# Patient Record
Sex: MALE | Race: WHITE | NOT HISPANIC OR LATINO | Employment: FULL TIME | URBAN - METROPOLITAN AREA
[De-identification: names, ages, dates, MRNs, and addresses within clinical notes are randomized per-mention and may not be internally consistent; named-entity substitution may affect disease eponyms.]

---

## 2022-12-01 ENCOUNTER — LAB REQUISITION (OUTPATIENT)
Dept: LAB | Facility: CLINIC | Age: 47
End: 2022-12-01
Payer: COMMERCIAL

## 2022-12-01 DIAGNOSIS — B96.89 OTHER SPECIFIED BACTERIAL AGENTS AS THE CAUSE OF DISEASES CLASSIFIED ELSEWHERE: ICD-10-CM

## 2022-12-01 DIAGNOSIS — M75.101 UNSPECIFIED ROTATOR CUFF TEAR OR RUPTURE OF RIGHT SHOULDER, NOT SPECIFIED AS TRAUMATIC: ICD-10-CM

## 2022-12-01 LAB
GRAM STAIN RESULT: NORMAL

## 2022-12-01 PROCEDURE — 87176 TISSUE HOMOGENIZATION CULTR: CPT | Mod: ORL | Performed by: ORTHOPAEDIC SURGERY

## 2022-12-01 PROCEDURE — 87075 CULTR BACTERIA EXCEPT BLOOD: CPT | Mod: ORL | Performed by: ORTHOPAEDIC SURGERY

## 2022-12-01 PROCEDURE — 87205 SMEAR GRAM STAIN: CPT | Mod: ORL | Performed by: ORTHOPAEDIC SURGERY

## 2022-12-07 LAB
BACTERIA TISS BX CULT: ABNORMAL

## 2022-12-14 LAB — BACTERIA TISS BX CULT: ABNORMAL

## 2022-12-15 LAB
BACTERIA TISS BX CULT: ABNORMAL
BACTERIA TISS BX CULT: NORMAL

## 2023-02-06 ENCOUNTER — TELEPHONE (OUTPATIENT)
Dept: UROLOGY | Facility: CLINIC | Age: 48
End: 2023-02-06
Payer: COMMERCIAL

## 2023-02-06 NOTE — TELEPHONE ENCOUNTER
" [facility] Call [facility]    Phone Message    May a detailed message be left on voicemail: yes     Reason for Call: Appointment Intake    Referring Provider Name: Rosa Bocanegra  Diagnosis and/or Symptoms: Inflamed Prostate, low testosterone.    Pt states he needs to speak to Kelsie that works with Dr. Cintron as pt was referred by an employee at the [facility]  (Rosa Bocanegra) and Kelsie knows \"whats going on\"    Action Taken: Message routed to:  [facility] Uro    Travel Screening: Not Applicable                                                                      "

## 2023-02-07 NOTE — TELEPHONE ENCOUNTER
Pt phoned, provided flomax and testosterone from IM provider, seeking to establish care in urology    Feels he does not empty his bladder well and has enlarged prostate.  His testosterone has improved, informed Dr. Cintron would likely refer to Dr. Stroud to follow this     appt made for next in person for uroflow pvr

## 2023-02-09 NOTE — TELEPHONE ENCOUNTER
MEDICAL RECORDS REQUEST   [facility]  Urology Clinic  [address] [place] [address]  PHONE: [phone]  Fax: [phone]        FUTURE VISIT INFORMATION                                                   Korey BELCHER KATHLEEN Arriola: 1975 scheduled for future visit at [facility]    APPOINTMENT INFORMATION:    Date: 2023    Provider:  Doe Cintron MD    Reason for Visit/Diagnosis: bph consult-Uroflow/pvr/aua on flomax from IM provider    RECORDS REQUESTED FOR VISIT                                                     NOTES  STATUS/DETAILS   OFFICE NOTE from other specialist  yes, 2023 -- Jasen Hargrove MD  @    MEDICATION LIST  yes   LABS     PSA  yes     PRE-VISIT CHECKLIST      Record collection complete Yes   Appointment appropriately scheduled           (right time/right provider) Yes   Joint diagnostic appointment coordinated correctly          (ensure right order & amount of time) Yes   MyChart activation If no, please explain PENDING   Questionnaire complete If no, please explain PENDING

## 2023-03-07 ENCOUNTER — PRE VISIT (OUTPATIENT)
Dept: UROLOGY | Facility: CLINIC | Age: 48
End: 2023-03-07

## 2023-03-07 ENCOUNTER — OFFICE VISIT (OUTPATIENT)
Dept: UROLOGY | Facility: CLINIC | Age: 48
End: 2023-03-07
Payer: COMMERCIAL

## 2023-03-07 VITALS
BODY MASS INDEX: 24.38 KG/M2 | SYSTOLIC BLOOD PRESSURE: 108 MMHG | HEART RATE: 85 BPM | WEIGHT: 190 LBS | DIASTOLIC BLOOD PRESSURE: 71 MMHG | HEIGHT: 74 IN | OXYGEN SATURATION: 97 %

## 2023-03-07 DIAGNOSIS — M62.89 PELVIC FLOOR DYSFUNCTION: Primary | ICD-10-CM

## 2023-03-07 PROCEDURE — 51741 ELECTRO-UROFLOWMETRY FIRST: CPT | Performed by: UROLOGY

## 2023-03-07 PROCEDURE — 51798 US URINE CAPACITY MEASURE: CPT | Performed by: UROLOGY

## 2023-03-07 PROCEDURE — 99203 OFFICE O/P NEW LOW 30 MIN: CPT | Mod: 25 | Performed by: UROLOGY

## 2023-03-07 PROCEDURE — 52000 CYSTOURETHROSCOPY: CPT | Performed by: UROLOGY

## 2023-03-07 RX ORDER — CEPHALEXIN 500 MG/1
500 CAPSULE ORAL ONCE
Status: COMPLETED | OUTPATIENT
Start: 2023-03-07 | End: 2023-03-07

## 2023-03-07 RX ORDER — TESTOSTERONE 20.25 MG/1.25G
GEL TOPICAL
COMMUNITY
Start: 2023-02-02

## 2023-03-07 RX ORDER — TAMSULOSIN HYDROCHLORIDE 0.4 MG/1
CAPSULE ORAL
COMMUNITY
Start: 2023-01-27 | End: 2024-06-21

## 2023-03-07 RX ORDER — VALACYCLOVIR HYDROCHLORIDE 1 G/1
TABLET, FILM COATED ORAL
COMMUNITY
Start: 2023-02-08

## 2023-03-07 RX ORDER — LIDOCAINE HYDROCHLORIDE 20 MG/ML
JELLY TOPICAL ONCE
Status: COMPLETED | OUTPATIENT
Start: 2023-03-07 | End: 2023-03-07

## 2023-03-07 RX ORDER — METHYLPHENIDATE HYDROCHLORIDE 10 MG/1
TABLET ORAL
COMMUNITY
Start: 2023-01-27

## 2023-03-07 RX ORDER — HYDROCORTISONE 2.5 %
CREAM (GRAM) TOPICAL
COMMUNITY
Start: 2022-11-16

## 2023-03-07 RX ADMIN — LIDOCAINE HYDROCHLORIDE: 20 JELLY TOPICAL at 16:15

## 2023-03-07 RX ADMIN — CEPHALEXIN 500 MG: 500 CAPSULE ORAL at 16:22

## 2023-03-07 ASSESSMENT — PAIN SCALES - GENERAL: PAINLEVEL: NO PAIN (0)

## 2023-03-07 NOTE — PROGRESS NOTES
UROLOGY OUTPATIENT VISIT      Chief Complaint:   LUTS      Synopsis    Kroey Arriola is a very pleasant AGE: 47 year old year old person  He is self-referred for a history of progressive difficulty emptying his bladder  He notes that he has had trouble with this including slow stream and feeling of incomplete emptying for many years  He recently sought evaluation by his primary medical provider who put him on Flomax  He reports that Flomax is inconsistent, doesn't like lightheadedness  A secondary concern of his hypogonadism.  He notes that he has felt generally fatigued and not quite like himself for the past several months.  His PMD ordered a testosterone level which came back in the 200s.  He was started on testosterone gel.  He is not having his testosterone levels monitored at the moment but subjectively reports that it is made a considerable improvement in his overall wellbeing.  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  We injected lidocaine jelly.  We gently passed the 15 [place]ijian flexible cystoscope through the urethral meatus.  The anterior urethra was normal once we reached the membranous urethra he had a very tight pelvic floor and we had considerable difficulty due to discomfort advancing the scope through the prostate and into the bladder.  We ultimately were able to pass the scope all the way into the bladder which appeared to be mildly trabeculated.  He was tolerating the scope  extremely poorly at this time.  Pullback ureteroscopy was also quite difficult due to the extremely tight pelvic floor.  The prostate however while elevated did not have very prominent hypertrophy.  We did not see any bladder pathology aside from mild trabeculations although the view was quite limited in brief.    A digital rectal exam was performed notable for a medium sized prostate without nodularity             Medications     Current Outpatient Medications   Medication     methylphenidate (RITALIN) 20 MG tablet     omeprazole (PRILOSEC) 20 MG DR capsule     Testosterone 1.62 % GEL     valACYclovir (VALTREX) 1000 mg tablet     hydrocortisone 2.5 % cream     tamsulosin (FLOMAX) 0.4 MG capsule     No current facility-administered medications for this visit.         The following  distinct labs were reviewed    I personally reviewed all applicable laboratory data and went over findings with patient  Significant for:    January 2023 -testosterone level 251 (this was repeated 4 days later and was 381)  January 2023 -PSA 1.4  December 2022 -hemoglobin 15.5, platelets 234  August 2022 -creatinine 0.83                   Assessment/Plan   47 year old year old person with lower urinary tract symptoms, incomplete bladder emptying, hypogonadism  -I suspect based on his work-up today that the primary etiology of his lower urinary tract symptoms is pelvic floor dysfunction.  I recommend that he meet with a pelvic floor physical therapist to learn biofeedback and pelvic floor relaxation.  We will follow-up with him in the next 2 to 3 months to ensure symptom improvement.  -In terms of his hypogonadism, I recommend consultation with an endocrinologist with a plan to regularly monitor testosterone levels.  A consult referral was placed    CC:  No primary care provider on file.      35v minutes spent on the date of the encounter, including direct interaction with the patient, performing chart review, documentation and further  activities as noted above, exclusive of the time spent performing  cystoscopy    IDoe saw and evaluated this patient and agree with the plan as stated above.  I personally performed all listed procedures.

## 2023-03-07 NOTE — NURSING NOTE
"Chief Complaint   Patient presents with     Consult     BPH       Blood pressure 108/71, pulse 85, height 1.88 m (6' 2\"), weight 86.2 kg (190 lb), SpO2 97 %. Body mass index is 24.39 kg/m .    There is no problem list on file for this patient.      Allergies   Allergen Reactions     No Clinical Screening - See Comments      PN: Other1: -cats, PN: Pollen, dust, mold and animal dander  PN: Other1: -cats, PN: Pollen, dust, mold and animal dander         Current Outpatient Medications   Medication Sig Dispense Refill     methylphenidate (RITALIN) 20 MG tablet        omeprazole (PRILOSEC) 20 MG DR capsule        Testosterone 1.62 % GEL APPLY 1 PUMP TOPICALLY TO THE AFFECTED AREA DAILY       valACYclovir (VALTREX) 1000 mg tablet        hydrocortisone 2.5 % cream APPLY THIN LAYER TOPICALLY TO RASH IN AXILLAE 2 TO 3 TIMES DAILY UNTIL IMPROVED (Patient not taking: Reported on 3/7/2023)       tamsulosin (FLOMAX) 0.4 MG capsule  (Patient not taking: Reported on 3/7/2023)         Social History     Tobacco Use     Smoking status: Never     Smokeless tobacco: Former     Quit date: 3/7/2013   Substance Use Topics     Alcohol use: Yes     Comment: Alcoholic Drinks/day: 15-18 per week     Drug use: Unknown     Types: Other     Comment: Drug use: No       Invasive Procedure Safety Checklist:    Procedure: Cystoscopy    Action: Complete sections and checkboxes as appropriate.    Pre-procedure:  1. Patient ID Verified with 2 identifiers (Deena and  or MRN) : YES    2. Procedure and site verified with patient/designee (when able) : YES    3. Accurate consent documentation in medical record : YES    4. H&P (or appropriate assessment) documented in medical record : N/A  H&P must be up to 30 days prior to procedure an updated within 24 hours of                 Procedure as applicable.     5. Relevant diagnostic and radiology test results appropriately labeled and displayed as applicable : YES    6. Blood products, implants, devices, " and/or special equipment available for the procedure as applicable : YES    7. Procedure site(s) marked with provider initials [Exclusions: none] : NO    8. Marking not required. Reason : Yes  Procedure does not require site marking    Time Out:     Time-Out performed immediately prior to starting procedure, including verbal and active participation of all team members addressing: YES    1. Correct patient identity.  2. Confirmed that the correct side and site are marked.  3. An accurate procedure to be done.  4. Agreement on the procedure to be done.  5. Correct patient position.  6. Relevant images and results are properly labeled and appropriately displayed.  7. The need to administer antibiotics or fluids for irrigation purposes during the procedure as applicable.  8. Safety precautions based on patient history or medication use.    During Procedure: Verification of correct person, site, and procedure occurs any time the responsibility for care of the patient is transferred to another member of the care team.    The following medication was given:     MEDICATION:  Lidocaine without epinephrine 2% jelly  ROUTE: urethral   SITE: urethral   DOSE: 10 mL  LOT #: [id number]  : [employer]  EXPIRATION DATE: 9-24  NDC#: [address]-9   Was there drug waste? No    Prior to med admin, verified patient identity using patient's name and date of birth.  Due to med administration, patient instructed to remain in clinic for 15 minutes  afterwards, and to report any adverse reaction to me immediately.    Drug Amount Wasted:  None.  Vial/Syringe: ADALBERTO Gibson  3/7/2023  4:44 PM

## 2023-03-07 NOTE — NURSING NOTE
"Chief Complaint   Patient presents with     Consult     BPH       Blood pressure 108/71, pulse 85, height 1.88 m (6' 2\"), weight 86.2 kg (190 lb), SpO2 97 %. Body mass index is 24.39 kg/m .    There is no problem list on file for this patient.      Allergies   Allergen Reactions     No Clinical Screening - See Comments      PN: Other1: -cats, PN: Pollen, dust, mold and animal dander  PN: Other1: -cats, PN: Pollen, dust, mold and animal dander         Current Outpatient Medications   Medication Sig Dispense Refill     methylphenidate (RITALIN) 20 MG tablet        omeprazole (PRILOSEC) 20 MG DR capsule        Testosterone 1.62 % GEL APPLY 1 PUMP TOPICALLY TO THE AFFECTED AREA DAILY       valACYclovir (VALTREX) 1000 mg tablet        hydrocortisone 2.5 % cream APPLY THIN LAYER TOPICALLY TO RASH IN AXILLAE 2 TO 3 TIMES DAILY UNTIL IMPROVED (Patient not taking: Reported on 3/7/2023)       tamsulosin (FLOMAX) 0.4 MG capsule  (Patient not taking: Reported on 3/7/2023)         Social History     Tobacco Use     Smoking status: Never     Smokeless tobacco: Former     Quit date: 3/7/2013   Substance Use Topics     Alcohol use: Yes     Comment: Alcoholic Drinks/day: 15-18 per week     Drug use: Unknown     Types: Other     Comment: Drug use: No       ADALBERTO Bess  3/7/2023  3:40 PM  "

## 2023-03-07 NOTE — PATIENT INSTRUCTIONS
Please schedule a 3-month follow up appointment with Dr. Cintron.    It was a pleasure meeting with you today.  Thank you for allowing me and my team the privilege of caring for you today.  YOU are the reason we are here, and I truly hope we provided you with the excellent service you deserve.  Please let us know if there is anything else we can do for you so that we can be sure you are leaving completely satisfied with your care experience.

## 2023-03-07 NOTE — LETTER
3/7/2023       RE: Korey Arriola  [address]  [facility] [address]     Dear Colleague,    Thank you for referring your patient, Korey Arriola, to the [facility]. Please see a copy of my visit note below.          UROLOGY OUTPATIENT VISIT      Chief Complaint:   LUTS      Synopsis    Korey Arriola is a very pleasant AGE: 47 year old year old person  He is self-referred for a history of progressive difficulty emptying his bladder  He notes that he has had trouble with this including slow stream and feeling of incomplete emptying for many years  He recently sought evaluation by his primary medical provider who put him on Flomax  He reports that Flomax is inconsistent, doesn't like lightheadedness  A secondary concern of his hypogonadism.  He notes that he has felt generally fatigued and not quite like himself for the past several months.  His PMD ordered a testosterone level which came back in the 200s.  He was started on testosterone gel.  He is not having his testosterone levels monitored at the moment but subjectively reports that it is made a considerable improvement in his overall wellbeing.  He denies family history of prostate problems  He denies history of urinary tract infection, prostatitis, hematuria   He has a job in IT sales    [facility] uro prostate review 3/7/2023   Peak Flow 7.8   Average Flow 3.9   Residual Volume by Ultrasound 322   Character of Curve Can not be characterized   AUA Score 27       Given the elevated postvoid residual and his acute symptoms shared decision was made to add on for a cystoscopy today  After obtaining informed consent we proceeded to the cystoscopy room  I started the procedure by prepping and draping the patient.  We injected lidocaine jelly.  We gently passed the 15 [place] flexible cystoscope through the urethral meatus.  The anterior urethra was normal once we reached the  membranous urethra he had a very tight pelvic floor and we had considerable difficulty due to discomfort advancing the scope through the prostate and into the bladder.  We ultimately were able to pass the scope all the way into the bladder which appeared to be mildly trabeculated.  He was tolerating the scope extremely poorly at this time.  Pullback ureteroscopy was also quite difficult due to the extremely tight pelvic floor.  The prostate however while elevated did not have very prominent hypertrophy.  We did not see any bladder pathology aside from mild trabeculations although the view was quite limited in brief.    A digital rectal exam was performed notable for a medium sized prostate without nodularity             Medications     Current Outpatient Medications   Medication     methylphenidate (RITALIN) 20 MG tablet     omeprazole (PRILOSEC) 20 MG DR capsule     Testosterone 1.62 % GEL     valACYclovir (VALTREX) 1000 mg tablet     hydrocortisone 2.5 % cream     tamsulosin (FLOMAX) 0.4 MG capsule     No current facility-administered medications for this visit.         The following  distinct labs were reviewed    I personally reviewed all applicable laboratory data and went over findings with patient  Significant for:    January 2023 -testosterone level 251 (this was repeated 4 days later and was 381)  January 2023 -PSA 1.4  December 2022 -hemoglobin 15.5, platelets 234  August 2022 -creatinine 0.83                   Assessment/Plan   47 year old year old person with lower urinary tract symptoms, incomplete bladder emptying, hypogonadism  -I suspect based on his work-up today that the primary etiology of his lower urinary tract symptoms is pelvic floor dysfunction.  I recommend that he meet with a pelvic floor physical therapist to learn biofeedback and pelvic floor relaxation.  We will follow-up with him in the next 2 to 3 months to ensure symptom improvement.  -In terms of his hypogonadism, I recommend  consultation with an endocrinologist with a plan to regularly monitor testosterone levels.  A consult referral was placed    CC:  No primary care provider on file.            Again, thank you for allowing me to participate in the care of your patient.      Sincerely,    Doe Cintron MD       VTE Assessment already completed for this visit

## 2023-03-07 NOTE — NURSING NOTE
The following medication was given:     MEDICATION:  CEPHALEXIN (Keflex)  ROUTE: PO  SITE: Medication was given orally   DOSE: 500 mg.  LOT #: [id number]  : [employer]  EXPIRATION DATE: 09/24  NDC#: (00) [id number]   Was there drug waste? No    Prior to administration, verified patient identity using patient's name and date of birth.    Drug Amount Wasted:  None.  Vial/Syringe: Single dose    Ananda Seaman, EMT   3/7/2023  4:46 PM

## 2023-03-07 NOTE — LETTER
Date:March 8, 2023      Provider requested that no letter be sent. Do not send.       [facility]

## 2023-03-08 ENCOUNTER — TELEPHONE (OUTPATIENT)
Dept: UROLOGY | Facility: CLINIC | Age: 48
End: 2023-03-08
Payer: COMMERCIAL

## 2023-03-09 ENCOUNTER — TELEPHONE (OUTPATIENT)
Dept: UROLOGY | Facility: CLINIC | Age: 48
End: 2023-03-09
Payer: COMMERCIAL

## 2023-04-20 ENCOUNTER — THERAPY VISIT (OUTPATIENT)
Dept: PHYSICAL THERAPY | Facility: CLINIC | Age: 48
End: 2023-04-20
Payer: COMMERCIAL

## 2023-04-20 DIAGNOSIS — M62.89 PELVIC FLOOR DYSFUNCTION: ICD-10-CM

## 2023-04-20 PROCEDURE — 97110 THERAPEUTIC EXERCISES: CPT | Mod: GP

## 2023-04-20 PROCEDURE — 97535 SELF CARE MNGMENT TRAINING: CPT | Mod: GP

## 2023-04-20 PROCEDURE — 97161 PT EVAL LOW COMPLEX 20 MIN: CPT | Mod: GP

## 2023-04-20 NOTE — PROGRESS NOTES
Physical Therapy Initial Evaluation  Subjective:  The history is provided by the patient.   Therapist Generated HPI Evaluation  Problem details: SUBJECTIVE:    MD order: Pelvic floor dysfunction  Chief Complaint: Frequent urination, incomplete emptying     Pt is a 47 year old male who presents to physical therapy with complaints of frequent urination, incomplete emptying, and difficulty starting the stream. Pt reports that he has been experiencing symptoms for the last 20 years. Pt reports that has some stress and anxiety that are managed with antidepressants which did not improve his urination. Pt exercises regularly. Pt used to sit when urinating which improved his emptying, but now his emptying is worse when sitting. Pt stands to urinate, but sometimes he will do both in order to empty his bladder. Pt will have stop and start urination that stops and starts 3-7 times during a bout of urination. Pt reports that in order for him to restart the flow of urine he as to perform a kegel. Pt reports that he is urinating 15+ times per day. Pt reports that his bladder feels empty after urination until 5 minutes later when he will return to the bathroom. Pt drinks 48 oz of still and carbonated water per day, 16oz of caffeine per day and no alcohol. Pt denies a history of constipation. Pt reports that he has left sided low back pain and bilateral hip pain.     Urination:  Do you leak on the way to the bathroom or with a strong urge to void? No   Do you leak with cough,sneeze, jumping, running?No   Any other activities that cause leaking? No   Do you have triggers that make you feel you can't wait to go to the bathroom? Yes what are they hot and cold water.  Type of pad and number used per day? NA  When you leak what is the amount? NA  How long can you delay the need to urinate? 3 - 5 minutes.   How many times do you get up to urinate at night? 1    Can you stop the flow of urine when on the toilet? Yes  Is the volume of  urine passed usually: medium. (8sec rule= 250ml with average bladder storing 400-600ml)  Do you feel empty when you are done? Yes  Do you strain to pass urine? No  Do you have a slow or hesitant urinary stream? Yes  Do you have difficulty initiating the urine stream? Yes  Is urination painful? No  How many bladder infections have you had in last 12 months? 0   Fluid intake(one glass is 8oz or one cup) 36-48 oz/day, 2 caffinated glasses/day  0 alcohol glasses/day.    Bowel habits:  Frequency of bowel movements? 1-2 times a day (Pt returns to the bathroom quickly after his firt void of the day)   Consistancy of stool? soft formed  Do you ignore the urge to defecate? No  Do you strain to pass stool? No    Aggrevating factors:  Is loss of stool associated with an activity (lifting, coughing, running) or a food)  No}      Sensation:   Can you tell if there is solid, liquid, or gas in the rectum?  Yes  Do you feel the urge to move your bowels?  Yes  Is the urge very strong and difficult to control? No Or weak/absent? No  Do you feel the rectum is empty with you finish a bowel movement?   Yes    Do you have abdominal pain?  No    Do you have rectal pain, pressure, or burning?  No     Pelvic Pain:  Do you have any pelvic pain? No   Pain with erections? No   Pain with ejaculation- No   Pain with sitting? No   Are you sexually active?Yes  Have you ever been worried for your physical safety? No   Do you have any depression, anxiety, panic attacks, excessive stress?  Yes  Any abdominal or pelvic surgeries? Appendectomy and hernial surgery   Are you having any regular exercise? Yes- 5days/week crossfit   Have you practiced the PF(kegel) exercises for 4 or more weeks? No .         Type of problem:  Pelvic dysfunction.    This is a chronic condition.    Where condition occurred: for unknown reasons.  Patient reports pain:  Lumbar spine left (Bilateral glute/hip pain ).  Pain is described as stabbing and is constant.  Pain is worse  in the A.M. and worse during the night.  Since onset symptoms are gradually worsening.  Symptoms are exacerbated by sitting  and relieved by activity/movement.    Previous treatment includes physical therapy (For his back and hips ). There was mild improvement following previous treatment.  Restrictions due to condition include:  Working in normal job without restrictions.  Barriers include:  None as reported by patient.                        Objective:  System                                 Pelvic Dysfunction Evaluation:        Flexibility:  Flexibility pelvic: Poor Hip IR ROM, Poor hip flexion ROM   Tightness present at:Hamstrings; Piriformis and Gluteals    Abdominal Wall:  Abdominal wall pelvic: Good breathing mechanics.    Trigger Points:  NA    Pelvic Clock Exam:    Ischiocavernosis pain:  -  Bulbocavernosis pain:  -  Transverse Perineal:  -  Levator ANI:  -  Perineal Body:  -      External Assessment:  External assessment pelvic: Voluntary contraction- present, voluntary relaxation- present, involuntary contraction- present   Skin Condition:  Normal    Bearing Down/Coughing:  Normal      Muscle Contraction/Perineal Mobility:  Elevation and urogential triangle descent  Internal Assessment:  Internal assessment pelvic: Rectal assessment. TTP over pubococcygeus, iliococcygeus and obturator internus.  Significant tightness exhibited throughout levator ani and EAS.     Contraction/Grade:  Good squeeze, good hold with lift, repeatable (4)          SEMG Biofeedback:  Semg biofeedback pelvic: Deferred                                          General     ROS    Assessment/Plan:    Patient is a 47 year old male with pelvic complaints.    Patient has the following significant findings with corresponding treatment plan.                Diagnosis 1:  Pelvic Floor Dysfunction  Pain -  manual therapy, self management, education and home program  Decreased ROM/flexibility - manual therapy and therapeutic exercise  Decreased  joint mobility - manual therapy and therapeutic exercise  Decreased proprioception - neuro re-education and therapeutic activities  Impaired muscle performance - biofeedback and neuro re-education  Decreased function - therapeutic activities    Therapy Evaluation Codes:   1) History comprised of:   Personal factors that impact the plan of care:      Anxiety, Gender and Time since onset of symptoms.    Comorbidity factors that impact the plan of care are:      Bowel/bladder changes.     Medications impacting care: Anti-depressant.  2) Examination of Body Systems comprised of:   Body structures and functions that impact the plan of care:      Lumbar spine and Pelvis.   Activity limitations that impact the plan of care are:      Lower urinary tract symptoms.  3) Clinical presentation characteristics are:   Stable/Uncomplicated.  4) Decision-Making    Low complexity using standardized patient assessment instrument and/or measureable assessment of functional outcome.  Cumulative Therapy Evaluation is: Low complexity.    Previous and current functional limitations:  (See Goal Flow Sheet for this information)    Short term and Long term goals: (See Goal Flow Sheet for this information)     Communication ability:  Patient appears to be able to clearly communicate and understand verbal and written communication and follow directions correctly.  Treatment Explanation - The following has been discussed with the patient:   RX ordered/plan of care  Anticipated outcomes  Possible risks and side effects  This patient would benefit from PT intervention to resume normal activities.   Rehab potential is good.    Frequency:  2 X a month, once daily  Duration:  for 3 months  Discharge Plan:  Achieve all LTG.  Independent in home treatment program.  Reach maximal therapeutic benefit.    Please refer to the daily flowsheet for treatment today, total treatment time and time spent performing 1:1 timed codes.

## 2023-04-21 NOTE — PROGRESS NOTES
Physical Therapy Initial Evaluation  Subjective:    Patient Health History                     Surgeries include:  Orthopedic surgery and other (Shoulder, appendix).    Current medications:  Anti-depressants and steroids (Ritilin).    Current occupation is Sales.   Primary job tasks include:  Computer work, lifting/carrying, prolonged standing and prolonged sitting.                                    Objective:  System    Physical Exam    General     ROS    Assessment/Plan:

## 2023-04-27 ENCOUNTER — THERAPY VISIT (OUTPATIENT)
Dept: PHYSICAL THERAPY | Facility: CLINIC | Age: 48
End: 2023-04-27
Payer: COMMERCIAL

## 2023-04-27 DIAGNOSIS — M62.89 PELVIC FLOOR DYSFUNCTION: Primary | ICD-10-CM

## 2023-04-27 PROCEDURE — 97112 NEUROMUSCULAR REEDUCATION: CPT | Mod: GP

## 2023-04-27 PROCEDURE — 97110 THERAPEUTIC EXERCISES: CPT | Mod: GP

## 2023-05-04 ENCOUNTER — THERAPY VISIT (OUTPATIENT)
Dept: PHYSICAL THERAPY | Facility: CLINIC | Age: 48
End: 2023-05-04
Payer: COMMERCIAL

## 2023-05-04 DIAGNOSIS — M62.89 PELVIC FLOOR DYSFUNCTION: Primary | ICD-10-CM

## 2023-05-04 PROCEDURE — 97535 SELF CARE MNGMENT TRAINING: CPT | Mod: GP

## 2023-05-04 PROCEDURE — 97112 NEUROMUSCULAR REEDUCATION: CPT | Mod: GP

## 2023-05-19 ENCOUNTER — THERAPY VISIT (OUTPATIENT)
Dept: PHYSICAL THERAPY | Facility: CLINIC | Age: 48
End: 2023-05-19
Payer: COMMERCIAL

## 2023-05-19 DIAGNOSIS — M62.89 PELVIC FLOOR DYSFUNCTION: Primary | ICD-10-CM

## 2023-05-19 PROCEDURE — 97530 THERAPEUTIC ACTIVITIES: CPT | Mod: GP

## 2023-05-19 PROCEDURE — 97140 MANUAL THERAPY 1/> REGIONS: CPT | Mod: GP

## 2023-06-12 ENCOUNTER — THERAPY VISIT (OUTPATIENT)
Dept: PHYSICAL THERAPY | Facility: CLINIC | Age: 48
End: 2023-06-12
Payer: COMMERCIAL

## 2023-06-12 DIAGNOSIS — M62.89 PELVIC FLOOR DYSFUNCTION: Primary | ICD-10-CM

## 2023-06-12 PROCEDURE — 97110 THERAPEUTIC EXERCISES: CPT | Mod: GP

## 2023-07-06 ENCOUNTER — THERAPY VISIT (OUTPATIENT)
Dept: PHYSICAL THERAPY | Facility: CLINIC | Age: 48
End: 2023-07-06
Payer: COMMERCIAL

## 2023-07-06 DIAGNOSIS — M62.89 PELVIC FLOOR DYSFUNCTION: Primary | ICD-10-CM

## 2023-07-06 PROCEDURE — 97535 SELF CARE MNGMENT TRAINING: CPT | Mod: GP

## 2023-07-06 NOTE — PROGRESS NOTES
07/06/23 0500   Appointment Info   Signing clinician's name / credentials Марина Squireslio PT DPT   Total/Authorized Visits [employer]   Visits Used 6   Medical Diagnosis Pelvic Floor Dysfunction   PT Tx Diagnosis Pelvic Floor Dysfunction   Progress Note/Certification   Onset of illness/injury or Date of Surgery 03/07/23  (Date of order)   Progress Note Due Date 07/14/23   Progress Note Completed Date 07/06/23   PT Goal 1   Goal Identifier Voiding   Goal Description Pt will decrease hesitation with voiding and decrease incomplete emptying to 25% of the time   Rationale to maximize safety and independence with self cares   Target Date 07/14/23   Subjective Report   Subjective Report Pt reports that things are okay. Pt feels that there is a direct link with his left sided low back pain and hip pain. Pt reports that his back pain and hip pain is getting worse. Pt reports that sometimes he pees really well- it's better at night. AUA score improved from 27 to 23. Pt is at a standstill with his progress and feels that it is time to return to urology for further workup.   Objective Measures   Objective Measures Objective Measure 1;Objective Measure 2   Objective Measure 1   Objective Measure Pelvic Floor   Details Pelvic floor muscle assessment rectally- observation- pt has elevated recting tone of pelvic floor muscles. No TTP. Increased tension in pelvic muscles bilaterally. Good coordination for contraction strength 3/5. Able to coordinate relaxation with breath holding.   Objective Measure 2   Objective Measure Low back/Hip   Details Lumbar spine ROM WNL. Pain in low ack reproduced with flexion and extension. Hip ROM IR limited bilaterally   Treatment Interventions (PT)   Interventions Self Care/Home Management   Therapeutic Procedure/Exercise   Therapeutic Procedures Ther Proc 2;Ther Proc 3;Ther Proc 4;Ther Proc 5;Ther Proc 6;Ther Proc 7   Ther Proc 1 Prone Press Up   Ther Proc 1 - Details 2x10- one set of 10 with right leg  bent up to gap left side. Pt able to perform pain free.   Ther Proc 2 Glute Myofascial Full Arc   Ther Proc 2 - Details 1x10   Ther Proc 3 Glute Myofasical lower arc   Ther Proc 3 - Details 1x10   Ther Proc 4 Glute myofascial upper arc   Ther Proc 4 - Details 1x10   Ther Proc 5 Glute myofasical piriformis   Ther Proc 5 - Details 1x10   Ther Proc 6 Glute Myofascial flexion/extension   Ther Proc 6 - Details 1x10   Ther Proc 7 Hip IR stretch   Ther Proc 7 - Details attempted in prone and seated. Seated added to HEP 3x30 seconds   PTRx Ther Proc 1 Extended Yasmeen Pose   PTRx Ther Proc 1 - Details Verbal review   PTRx Ther Proc 2 Pretzel Stretch   PTRx Ther Proc 2 - Details 1x30 seconds   Skilled Intervention Verbal and tactile cueing for proper executio of exercises   Patient Response/Progress Pt tolerated well with soreness after glute myofasical exercises   Therapeutic Activity   Ther Act 1 Moo to Poo/ diaphragm breathing   Ther Act 1 - Details 2x10 seated and standing with cues to lightly exhale whole lengthening pelvic floor and expanding abdominals   PTRx Ther Act 1 Education Sheet General   PTRx Ther Act 1 - Details Verbal review   Manual Therapy   Manual Therapy 1 Myofascial Rectal Mobilizations   Manual Therapy 1 - Details thiels massage, pelvic floor contract, relax, and lengthen with added overpressure stretch   Skilled Intervention Improving pelvic muscle length and ROM   Patient Response/Progress Pt tolerated well. Pain free   Self Care/home Management   ADL/Home Mgmt Training ([id number]) 15   Self Care 1 Pelvic Muscle   Self Care 1 - Details Pt was educated to try to take CBD gummies for overall pelvic muscle relaxation and general calming   Skilled Intervention Education   Patient Response/Progress Pt verbalized understanding '   Self Care 2 Reviewed HEP   Self Care 2 - Details Pt's HEP was reviewd and pt was encouraged to conrinue to work on exercises as well as reach back out to the doctor to see if there  are other interventions woth exploring   Education   Learner/Method Patient   Education Comments Patient demonstrated understanding   Plan   Home program Continue HEP- pelvic stretches, pelvic floor contractions, moo to pp   Updates to plan of care Continue every 2-3 weeks until 7/14/2023   Comments   Comments Pt is progressing well and would benefit from skilled therapy to continue progress with LUTS   Total Session Time   Timed Code Treatment Minutes 15   Total Treatment Time (sum of timed and untimed services) 15         DISCHARGE  Reason for Discharge: Patient chooses to discontinue therapy.    Equipment Issued: NA    Discharge Plan: Patient to continue home program.  Refer back to MD     Referring Provider:  Doe Cintron

## 2023-07-12 ENCOUNTER — VIRTUAL VISIT (OUTPATIENT)
Dept: ENDOCRINOLOGY | Facility: CLINIC | Age: 48
End: 2023-07-12
Attending: UROLOGY
Payer: COMMERCIAL

## 2023-07-12 DIAGNOSIS — M62.89 PELVIC FLOOR DYSFUNCTION: ICD-10-CM

## 2023-07-12 DIAGNOSIS — R79.89 ABNORMALITY OF TESTOSTERONE: Primary | ICD-10-CM

## 2023-07-12 PROCEDURE — 99204 OFFICE O/P NEW MOD 45 MIN: CPT | Mod: VID | Performed by: INTERNAL MEDICINE

## 2023-07-12 RX ORDER — ESCITALOPRAM OXALATE 10 MG/1
TABLET ORAL
COMMUNITY
Start: 2023-06-26

## 2023-07-12 NOTE — PROGRESS NOTES
Virtual Visit Details    Type of service:  Video Visit     Originating Location (pt. Location): Home  Distant Location (provider location):  Off-site  Platform used for Video Visit: Logan        Name: Korey Arriola is a 47 year old man, seen at the request of Dr. Doe Cintron for evaluation of     Chief Complaint   Patient presents with     Endocrine Problem       HPI:  Recent issues:  Here for evaluation of testosterone concerns  Reviewed medical history from patient and Epic chart record        ~1/2023 General medicine evaluation with Dr. Jasen Hargrove/[facility]  Concerns for symptoms with fatigue, less mental motivation  1/27/23 Labs showed total testosterone 251 ng/dL  Advised to start Flomax for urinary symptoms, also given Rx for the testosterone gel medication,    advised to start the testosterone medication and then follow-up  Patient started the testosterone gel 1.62% 1-pump to shoulders daily  He noticed much better, more energy and mental focus    Patient decided to see a urologist for slowed urinary flow  3/7/23 Urology evaluation with Dr. Doe Cintron  Evaluation for the lower urinary tract symptoms, incomplete bladder emptying, hypogonadism  Endocrinology referral    Previous  labs include:    (normal ranges for HP:  Total test. 200-745, bioavail test. 71.7-300)      Additional health history:   Testicular injury:       none         Testicular surgery:   none           Fam Hx Hypogonadism: None known        Lives in [place], [place]  Sees Dr. Jasen Hargrove/[facility] for general medicine evaluations.    PMH/PSH:  Past Medical History:   Diagnosis Date     ADHD (attention deficit hyperactivity disorder)      Esophageal reflux      Pelvic floor dysfunction      Personal history of other medical treatment (CODE)     No Comments Provided     Right shoulder pain     previous right shoulder surgery     Slow urinary stream      Past Surgical History:   Procedure Laterality  Date     APPENDECTOMY OPEN      No Comments Provided     ARTHROSCOPY SHOULDER      [place] ANES SURGERY OF SHOULDER       Family Hx:  No family history on file.      Social Hx:  Social History     Socioeconomic History     Marital status:      Spouse name: Not on file     Number of children: Not on file     Years of education: Not on file     Highest education level: Not on file   Occupational History     Not on file   Tobacco Use     Smoking status: Never     Smokeless tobacco: Former     Quit date: 3/7/2013   Substance and Sexual Activity     Alcohol use: Yes     Comment: Alcoholic Drinks/day: 15-18 per week     Drug use: Unknown     Types: Other     Comment: Drug use: No     Sexual activity: Not on file   Other Topics Concern     Not on file   Social History Narrative    p 8/23/2013.     Social Determinants of Health     Financial Resource Strain: Not on file   Food Insecurity: Not on file   Transportation Needs: Not on file   Physical Activity: Not on file   Stress: Not on file   Social Connections: Not on file   Intimate Partner Violence: Not on file   Housing Stability: Not on file          MEDICATIONS:  has a current medication list which includes the following prescription(s): escitalopram, methylphenidate, omeprazole, testosterone, valacyclovir, hydrocortisone, and tamsulosin.    ROS:     ROS: 10 point ROS neg other than the symptoms noted above in the HPI.    GENERAL: some fatigue, wt stable; denies fevers, chills, night sweats.    HEENT: no dysphagia, odonophagia, diplopia, neck pain  THYROID:  no apparent hyper or hypothyroid symptoms  CV: no chest pain, pressure, palpitations  LUNGS: no SOB, DOBBINS, cough, wheezing   ABDOMEN: no diarrhea, constipation, abdominal pain  EXTREMITIES: no rashes, ulcers, edema  NEUROLOGY: no headaches, denies changes in vision, tingling, extremitiy numbness   MSK: some right shoulder pain with ROM; denies muscle weakness  SKIN: no rashes or lesions  :  Slowed urinary  stream, good erection function  PSYCH:  stable mood, no significant anxiety or depression  ENDOCRINE: no heat or cold intolerance    Physical Exam (visual exam)  VS:  no vital signs taken for video visit  CONSTITUTIONAL: healthy, alert and NAD, well dressed, answering questions appropriately  ENT: no nose swelling or nasal discharge, mouth redness or gum changes.  EYES: eyes grossly normal to inspection, conjunctivae and sclerae normal, no exophthalmos or proptosis  THYROID:  no apparent nodules or goiter  LUNGS: no audible wheeze, cough or visible cyanosis, no visible retractions or increased work of breathing  ABDOMEN: abdomen not evaluated  EXTREMITIES: no hand tremors, limited exam  NEUROLOGY: CN grossly intact, mentation intact and speech normal   SKIN:  no apparent skin lesions, rash, or edema with visualized skin appearance  PSYCH: mentation appears normal, affect normal/bright, judgement and insight intact,   normal speech and appearance well groomed      LABS:    All pertinent notes, labs, and images personally reviewed by me.     A/P:  Encounter Diagnoses   Name Primary?     Abnormality of testosterone- low normal testosterone level Yes     Pelvic floor dysfunction        Comments:  Reviewed health history and testosterone issues.  He had low-normal (not low) testosterone levels and started low dose testosterone gel medication use, feeling better with this off-label treatment    Plan:  Discussed general issues with men that have hypogonadism  We discussed lab tests to assess testosterone axis hormone levels.  Reviewed potential treatment options with topical, nasal, and injectable testosterone medication use.    Recommend:  Encouraged patient to decide whether he wishes to continue the testosterone med treatment per Dr. Hargrove   ... or stop the testosterone medication use  Monitor for symptom changes  No lab test ordered at this time  No testicular or pituitary imaging needed at this time  Encouraged  patient to review the testosterone medication plan with his prescribing physician (PCP)   Lab testing should include total testosterone, PSA, CBC (hemoglobin, hematocrit)  I explained that I do not prescribe testosterone medication for men with normal (or low-normal) testosterone levels  Arrange follow-up evaluation with urologist to review slowed urinary stream, pelvic floor dysfunction topics    Addressed patient questions today    There are no Patient Instructions on file for this visit.    Future labs ordered today: No orders of the defined types were placed in this encounter.    Radiology/Consults ordered today:     Total time spent on day of encounter:  48 min    Follow-up:  magui Arrington MD, MS  Endocrinology  [facility]    CC: Doe Cintron and Jasen Hargrove

## 2023-07-12 NOTE — LETTER
7/12/2023         RE: Korey Arriola  [address] [facility] [address]        Dear Colleague,    Thank you for referring your patient, Korey Arriola, to the [facility] SPECIAL[facility] [place]. Please see a copy of my visit note below.    Virtual Visit Details    Type of service:  Video Visit     Originating Location (pt. Location): Home  Distant Location (provider location):  Off-site  Platform used for Video Visit: [facility]        Name: Korey Arriola is a 47 year old man, seen at the request of Dr. Doe Cintron for evaluation of     Chief Complaint   Patient presents with     Endocrine Problem       HPI:  Recent issues:  Here for evaluation of testosterone concerns  Reviewed medical history from patient and Epic chart record        ~1/2023 General medicine evaluation with Dr. Jasen Hargrove/[facility]  Concerns for symptoms with fatigue, less mental motivation  1/27/23 Labs showed total testosterone 251 ng/dL  Advised to start Flomax for urinary symptoms, also given Rx for the testosterone gel medication,    advised to start the testosterone medication and then follow-up  Patient started the testosterone gel 1.62% 1-pump to shoulders daily  He noticed much better, more energy and mental focus    Patient decided to see a urologist for slowed urinary flow  3/7/23 Urology evaluation with Dr. Doe Cintron  Evaluation for the lower urinary tract symptoms, incomplete bladder emptying, hypogonadism  Endocrinology referral    Previous  labs include:        Additional health history:   Testicular injury:       none         Testicular surgery:   none           Fam Hx Hypogonadism: None known        Lives in [place], [place]  Sees Dr. Jasen Hargrove/[facility] for general medicine evaluations.    PMH/PSH:  Past Medical History:   Diagnosis Date     ADHD (attention deficit hyperactivity disorder)      Esophageal reflux      Pelvic floor dysfunction      Personal history of other medical  treatment (CODE)     No Comments Provided     Right shoulder pain     previous right shoulder surgery     Slow urinary stream      Past Surgical History:   Procedure Laterality Date     APPENDECTOMY OPEN      No Comments Provided     ARTHROSCOPY SHOULDER      ID ANES SURGERY OF SHOULDER       Family Hx:  No family history on file.      Social Hx:  Social History     Socioeconomic History     Marital status:      Spouse name: Not on file     Number of children: Not on file     Years of education: Not on file     Highest education level: Not on file   Occupational History     Not on file   Tobacco Use     Smoking status: Never     Smokeless tobacco: Former     Quit date: 3/7/2013   Substance and Sexual Activity     Alcohol use: Yes     Comment: Alcoholic Drinks/day: 15-18 per week     Drug use: Unknown     Types: Other     Comment: Drug use: No     Sexual activity: Not on file   Other Topics Concern     Not on file   Social History Narrative    p 8/23/2013.     Social Determinants of Health     Financial Resource Strain: Not on file   Food Insecurity: Not on file   Transportation Needs: Not on file   Physical Activity: Not on file   Stress: Not on file   Social Connections: Not on file   Intimate Partner Violence: Not on file   Housing Stability: Not on file          MEDICATIONS:  has a current medication list which includes the following prescription(s): escitalopram, methylphenidate, omeprazole, testosterone, valacyclovir, hydrocortisone, and tamsulosin.    ROS:     ROS: 10 point ROS neg other than the symptoms noted above in the HPI.    GENERAL: some fatigue, wt stable; denies fevers, chills, night sweats.    HEENT: no dysphagia, odonophagia, diplopia, neck pain  THYROID:  no apparent hyper or hypothyroid symptoms  CV: no chest pain, pressure, palpitations  LUNGS: no SOB, DOBBINS, cough, wheezing   ABDOMEN: no diarrhea, constipation, abdominal pain  EXTREMITIES: no rashes, ulcers, edema  NEUROLOGY: no headaches,  denies changes in vision, tingling, extremitiy numbness   MSK: some right shoulder pain with ROM; denies muscle weakness  SKIN: no rashes or lesions  :  Slowed urinary stream, good erection function  PSYCH:  stable mood, no significant anxiety or depression  ENDOCRINE: no heat or cold intolerance    Physical Exam (visual exam)  VS:  no vital signs taken for video visit  CONSTITUTIONAL: healthy, alert and NAD, well dressed, answering questions appropriately  ENT: no nose swelling or nasal discharge, mouth redness or gum changes.  EYES: eyes grossly normal to inspection, conjunctivae and sclerae normal, no exophthalmos or proptosis  THYROID:  no apparent nodules or goiter  LUNGS: no audible wheeze, cough or visible cyanosis, no visible retractions or increased work of breathing  ABDOMEN: abdomen not evaluated  EXTREMITIES: no hand tremors, limited exam  NEUROLOGY: CN grossly intact, mentation intact and speech normal   SKIN:  no apparent skin lesions, rash, or edema with visualized skin appearance  PSYCH: mentation appears normal, affect normal/bright, judgement and insight intact,   normal speech and appearance well groomed      LABS:    All pertinent notes, labs, and images personally reviewed by me.     A/P:  Encounter Diagnoses   Name Primary?     Abnormality of testosterone- low normal testosterone level Yes     Pelvic floor dysfunction        Comments:  Reviewed health history and testosterone issues.  He had low-normal (not low) testosterone levels and started low dose testosterone gel medication use, feeling better with this off-label treatment    Plan:  Discussed general issues with men that have hypogonadism  We discussed lab tests to assess testosterone axis hormone levels.  Reviewed potential treatment options with topical, nasal, and injectable testosterone medication use.    Recommend:  Encouraged patient to decide whether he wishes to continue the testosterone med treatment per Dr. Hargrove   ... or  stop the testosterone medication use  Monitor for symptom changes  No lab test ordered at this time  No testicular or pituitary imaging needed at this time  Encouraged patient to review the testosterone medication plan with his prescribing physician (PCP)   Lab testing should include total testosterone, PSA, CBC (hemoglobin, hematocrit)  I explained that I do not prescribe testosterone medication for men with normal (or low-normal) testosterone levels  Arrange follow-up evaluation with urologist to review slowed urinary stream, pelvic floor dysfunction topics    Addressed patient questions today    There are no Patient Instructions on file for this visit.    Future labs ordered today: No orders of the defined types were placed in this encounter.    Radiology/Consults ordered today:     Total time spent on day of encounter:  48 min    Follow-up:  magui Arrington MD, MS  Endocrinology  [facility]    CC: Doe Cintron and Jasen Hargrove         Again, thank you for allowing me to participate in the care of your patient.        Sincerely,        Dillan Arrington MD

## 2023-08-29 ENCOUNTER — PRE VISIT (OUTPATIENT)
Dept: UROLOGY | Facility: CLINIC | Age: 48
End: 2023-08-29
Payer: COMMERCIAL

## 2023-08-29 NOTE — TELEPHONE ENCOUNTER
Reason for visit: Follow-Up    Dx/Hx/Sx: LUTS    Records/imaging/labs/orders: In EPIC    At Rooming: paper AUA; collect urine; PVR    Ananda Seaman, EMT  August 29, 2023  10:38 AM

## 2023-10-03 ENCOUNTER — OFFICE VISIT (OUTPATIENT)
Dept: UROLOGY | Facility: CLINIC | Age: 48
End: 2023-10-03
Payer: COMMERCIAL

## 2023-10-03 VITALS
WEIGHT: 195 LBS | BODY MASS INDEX: 25.03 KG/M2 | HEIGHT: 74 IN | DIASTOLIC BLOOD PRESSURE: 73 MMHG | SYSTOLIC BLOOD PRESSURE: 106 MMHG | HEART RATE: 73 BPM

## 2023-10-03 DIAGNOSIS — N40.0 ENLARGED PROSTATE: Primary | ICD-10-CM

## 2023-10-03 PROCEDURE — 99214 OFFICE O/P EST MOD 30 MIN: CPT | Performed by: UROLOGY

## 2023-10-03 ASSESSMENT — PAIN SCALES - GENERAL: PAINLEVEL: NO PAIN (0)

## 2023-10-03 NOTE — LETTER
10/3/2023       RE: Korey Arriola  [address]  [facility] [address]     Dear Colleague,    Thank you for referring your patient, Korey Arriola, to the [facility]OLOG[facility] [place] at [facility]. Please see a copy of my visit note below.          UROLOGY OUTPATIENT VISIT      Chief Complaint:   Slow urinary stream      Synopsis    Korey Arriola is a very pleasant AGE: 48 year old year old person.  We met about 6 months ago at which time he was worked up for incomplete bladder emptying and slow urinary stream.  We had performed a cystoscopy showing high suspicion of a very dysfunctional and tight pelvic floor.  We had had him try pelvic floor physical therapy which she endorses went to 1 or 2 sessions and generally does feel like there is a good experience but does not truly solve his problem and he is hesitant to commit to ongoing therapy.  He is quite frustrated and bothered by the degree of his symptoms as he often feels like he is on the verge of urinary retention.  He states that he cannot continue to  manage his symptoms without intervention and notes that something has to be done to improve his quality of life.         Medications     Current Outpatient Medications   Medication    escitalopram (LEXAPRO) 10 MG tablet    methylphenidate (RITALIN) 10 MG tablet    omeprazole (PRILOSEC) 20 MG DR capsule    Testosterone 1.62 % GEL    valACYclovir (VALTREX) 1000 mg tablet    hydrocortisone 2.5 % cream    tamsulosin (FLOMAX) 0.4 MG capsule     No current facility-administered medications for this visit.              Assessment/Plan   48 year old year old person with suspected bladder neck obstruction  -We discussed the various available management options including minimally invasive treatments, bladder neck incision, transurethral resection, enucleation.  He is somewhat averse to the potential side effects of surgery including retrograde ejaculation.   At the same time he does not believe he can undergo another cystoscopy without anesthesia.  Based on my prior cystoscopy with him I suspect he may have a primary bladder neck obstruction as there was not much in the way of obstructing prostate tissue.  We discussed that 1 thing we could consider would be a cystoscopy under anesthesia at which time should his bladder neck the noted to be elevated we could make a bladder neck incision.  This may be a hybrid intervention presents both a diagnostic and potentially therapeutic strategy.  He is aware that a bladder neck incision by itself may not be truly definitive or long-lasting and could potentially need retreatment but he would prefer to start with a minimally invasive approach such or as this prior to considering anything more aggressive  such as TURP or enucleation.  We did discuss the potential consequences of any prostate intervention including but not limited to bleeding, scarring, retrograde ejaculation, need for postoperative catheter.    CC:  Jasen Hargrove      Again, thank you for allowing me to participate in the care of your patient.      Sincerely,    Doe Cintron MD

## 2023-10-17 NOTE — PROGRESS NOTES
UROLOGY OUTPATIENT VISIT      Chief Complaint:   Slow urinary stream      Synopsis    Korey Arriola is a very pleasant AGE: 48 year old year old person.  We met about 6 months ago at which time he was worked up for incomplete bladder emptying and slow urinary stream.  We had performed a cystoscopy showing high suspicion of a very dysfunctional and tight pelvic floor.  We had had him try pelvic floor physical therapy which she endorses went to 1 or 2 sessions and generally does feel like there is a good experience but does not truly solve his problem and he is hesitant to commit to ongoing therapy.  He is quite frustrated and bothered by the degree of his symptoms as he often feels like he is on the verge of urinary retention.  He states that he cannot continue to  manage his symptoms without intervention and notes that something has to be done to improve his quality of life.         Medications     Current Outpatient Medications   Medication    escitalopram (LEXAPRO) 10 MG tablet    methylphenidate (RITALIN) 10 MG tablet    omeprazole (PRILOSEC) 20 MG DR capsule    Testosterone 1.62 % GEL    valACYclovir (VALTREX) 1000 mg tablet    hydrocortisone 2.5 % cream    tamsulosin (FLOMAX) 0.4 MG capsule     No current facility-administered medications for this visit.              Assessment/Plan   48 year old year old person with suspected bladder neck obstruction  -We discussed the various available management options including minimally invasive treatments, bladder neck incision, transurethral resection, enucleation.  He is somewhat averse to the potential side effects of surgery including retrograde ejaculation.  At the same time he does not believe he can undergo another cystoscopy without anesthesia.  Based on my prior cystoscopy with him I suspect he may have a primary bladder neck obstruction as there was not much in the way of obstructing prostate tissue.  We discussed that 1 thing we could consider would be a  cystoscopy under anesthesia at which time should his bladder neck the noted to be elevated we could make a bladder neck incision.  This may be a hybrid intervention presents both a diagnostic and potentially therapeutic strategy.  He is aware that a bladder neck incision by itself may not be truly definitive or long-lasting and could potentially need retreatment but he would prefer to start with a minimally invasive approach such or as this prior to considering anything more aggressive  such as TURP or enucleation.  We did discuss the potential consequences of any prostate intervention including but not limited to bleeding, scarring, retrograde ejaculation, need for postoperative catheter.    CC:  Jasen Hargrove

## 2023-10-26 ENCOUNTER — TELEPHONE (OUTPATIENT)
Dept: UROLOGY | Facility: CLINIC | Age: 48
End: 2023-10-26
Payer: COMMERCIAL

## 2023-11-29 ENCOUNTER — TELEPHONE (OUTPATIENT)
Dept: UROLOGY | Facility: CLINIC | Age: 48
End: 2023-11-29
Payer: COMMERCIAL

## 2023-12-05 ENCOUNTER — TELEPHONE (OUTPATIENT)
Dept: UROLOGY | Facility: CLINIC | Age: 48
End: 2023-12-05
Payer: COMMERCIAL

## 2023-12-07 ENCOUNTER — TELEPHONE (OUTPATIENT)
Dept: UROLOGY | Facility: CLINIC | Age: 48
End: 2023-12-07
Payer: COMMERCIAL

## 2024-03-29 ENCOUNTER — HOSPITAL ENCOUNTER (OUTPATIENT)
Facility: AMBULATORY SURGERY CENTER | Age: 49
End: 2024-03-29
Attending: UROLOGY
Payer: COMMERCIAL

## 2024-03-29 PROBLEM — N40.0 ENLARGED PROSTATE: Status: ACTIVE | Noted: 2023-10-03

## 2024-05-20 ENCOUNTER — TELEPHONE (OUTPATIENT)
Dept: UROLOGY | Facility: CLINIC | Age: 49
End: 2024-05-20
Payer: COMMERCIAL

## 2024-05-20 NOTE — TELEPHONE ENCOUNTER
Left message for patient with RN direct number.     GAVINO Spaulding  Care Coordinator- Urology   [phone]

## 2024-05-20 NOTE — TELEPHONE ENCOUNTER
[facility] Call Center    Phone Message    May a detailed message be left on voicemail: yes     Reason for Call: Other: Patient called in regarding a surgery he was going to set up in the end of 2023 with Dr Cintron. Per protocols and time line since last encounter; sending message to clinical pool for review. Please review and reach out to patient.     Action Taken: Other: UROLOGY    Travel Screening: Not Applicable

## 2024-05-23 NOTE — TELEPHONE ENCOUNTER
Pt returned call to RN and explained he would like to reschedule his previously cancelled cystoscopy with bladder neck incisions. Pt reports his symptoms are unchanged and he does not have additional questions for Dr. Cintron prior to proceeding.     Given that his consult was over 6 months ago (10/2023), RN will discuss with MD to assess if a follow up appointment is needed prior to rescheduling surgery.     RN will call pt with next steps.     GAVINO Spaulding  Care Coordinator- Urology   [phone]

## 2024-05-27 DIAGNOSIS — N13.9 URINARY OBSTRUCTION: Primary | ICD-10-CM

## 2024-05-28 ENCOUNTER — TELEPHONE (OUTPATIENT)
Dept: UROLOGY | Facility: CLINIC | Age: 49
End: 2024-05-28
Payer: COMMERCIAL

## 2024-05-28 PROBLEM — N13.9 URINARY OBSTRUCTION: Status: ACTIVE | Noted: 2024-05-27

## 2024-05-28 NOTE — TELEPHONE ENCOUNTER
Spoke with: Patient       Date of surgery: Friday June 21 2024 with Dr Cintron      Location: [facility]      Informed patient they will need a adult : YES      Pre op with provider: Patient had a annual phys at [facility] 4/24/24 called patient to let him know that he will need to get a updated pre op it will be well over the 30 day  window     H&P Scheduled in PAC- NA         Pre procedure covid : Not req      Additional imaging: NA        Surgery Packet : mailed to patient       Additional comments: Please call for surgery teaching

## 2024-06-05 ENCOUNTER — TELEPHONE (OUTPATIENT)
Dept: UROLOGY | Facility: CLINIC | Age: 49
End: 2024-06-05
Payer: COMMERCIAL

## 2024-06-05 DIAGNOSIS — N13.9 URINARY OBSTRUCTION: ICD-10-CM

## 2024-06-05 DIAGNOSIS — N40.0 ENLARGED PROSTATE: Primary | ICD-10-CM

## 2024-06-05 NOTE — TELEPHONE ENCOUNTER
Procedure: Cysto w/ bladder neck incision    Date: 06/21  Provider: Abdulaziz  Time: [facility] time tbd, arrive 1.5 hours prior    Pre op appt: Done 06/05, cleraed  UA/UC: ordered, to be done 06/10 at     Reviewed medications (anticoagulants, diabetes medications etc): reviewed  Soap: reviewed  Reviewed when to start clear liquids and when to start NPO: yes  Confirmed pt has  and 24 hour observation: yes    Pt or family member expressed understanding: yes    Chelly Lopes RN  6/5/2024  2:16 PM

## 2024-06-10 ENCOUNTER — LAB (OUTPATIENT)
Dept: LAB | Facility: CLINIC | Age: 49
End: 2024-06-10
Payer: COMMERCIAL

## 2024-06-10 DIAGNOSIS — N13.9 URINARY OBSTRUCTION: ICD-10-CM

## 2024-06-10 PROCEDURE — 87086 URINE CULTURE/COLONY COUNT: CPT

## 2024-06-11 LAB — BACTERIA UR CULT: NO GROWTH

## 2024-06-20 ENCOUNTER — ANESTHESIA EVENT (OUTPATIENT)
Dept: SURGERY | Facility: AMBULATORY SURGERY CENTER | Age: 49
End: 2024-06-20
Payer: COMMERCIAL

## 2024-06-21 ENCOUNTER — HOSPITAL ENCOUNTER (OUTPATIENT)
Facility: AMBULATORY SURGERY CENTER | Age: 49
Discharge: HOME | End: 2024-06-21
Attending: UROLOGY
Payer: COMMERCIAL

## 2024-06-21 ENCOUNTER — ANESTHESIA (OUTPATIENT)
Dept: SURGERY | Facility: AMBULATORY SURGERY CENTER | Age: 49
End: 2024-06-21
Payer: COMMERCIAL

## 2024-06-21 ENCOUNTER — TRANSFERRED RECORDS (OUTPATIENT)
Dept: HEALTH INFORMATION MANAGEMENT | Facility: CLINIC | Age: 49
End: 2024-06-21

## 2024-06-21 VITALS
TEMPERATURE: 96.8 F | SYSTOLIC BLOOD PRESSURE: 104 MMHG | OXYGEN SATURATION: 99 % | DIASTOLIC BLOOD PRESSURE: 65 MMHG | RESPIRATION RATE: 16 BRPM | HEART RATE: 58 BPM

## 2024-06-21 DIAGNOSIS — N40.0 ENLARGED PROSTATE: ICD-10-CM

## 2024-06-21 DIAGNOSIS — N40.0 ENLARGED PROSTATE: Primary | ICD-10-CM

## 2024-06-21 DIAGNOSIS — N13.9 URINARY OBSTRUCTION: ICD-10-CM

## 2024-06-21 PROCEDURE — 52601 PROSTATECTOMY (TURP): CPT | Performed by: UROLOGY

## 2024-06-21 RX ORDER — LIDOCAINE 40 MG/G
CREAM TOPICAL
Status: DISCONTINUED | OUTPATIENT
Start: 2024-06-21 | End: 2024-06-22 | Stop reason: HOSPADM

## 2024-06-21 RX ORDER — MEPERIDINE HYDROCHLORIDE 25 MG/ML
12.5 INJECTION INTRAMUSCULAR; INTRAVENOUS; SUBCUTANEOUS EVERY 5 MIN PRN
Status: DISCONTINUED | OUTPATIENT
Start: 2024-06-21 | End: 2024-06-22 | Stop reason: HOSPADM

## 2024-06-21 RX ORDER — CEFAZOLIN SODIUM 2 G/100ML
2 INJECTION, SOLUTION INTRAVENOUS SEE ADMIN INSTRUCTIONS
Status: DISCONTINUED | OUTPATIENT
Start: 2024-06-21 | End: 2024-06-22 | Stop reason: HOSPADM

## 2024-06-21 RX ORDER — KETOROLAC TROMETHAMINE 30 MG/ML
INJECTION, SOLUTION INTRAMUSCULAR; INTRAVENOUS PRN
Status: DISCONTINUED | OUTPATIENT
Start: 2024-06-21 | End: 2024-06-21

## 2024-06-21 RX ORDER — ONDANSETRON 2 MG/ML
4 INJECTION INTRAMUSCULAR; INTRAVENOUS EVERY 30 MIN PRN
Status: DISCONTINUED | OUTPATIENT
Start: 2024-06-21 | End: 2024-06-22 | Stop reason: HOSPADM

## 2024-06-21 RX ORDER — PHENAZOPYRIDINE HYDROCHLORIDE 200 MG/1
200 TABLET, FILM COATED ORAL 3 TIMES DAILY PRN
Qty: 12 TABLET | Refills: 0 | Status: SHIPPED | OUTPATIENT
Start: 2024-06-21

## 2024-06-21 RX ORDER — FENTANYL CITRATE 0.05 MG/ML
25 INJECTION, SOLUTION INTRAMUSCULAR; INTRAVENOUS EVERY 5 MIN PRN
Status: DISCONTINUED | OUTPATIENT
Start: 2024-06-21 | End: 2024-06-22 | Stop reason: HOSPADM

## 2024-06-21 RX ORDER — FENTANYL CITRATE 50 UG/ML
INJECTION, SOLUTION INTRAMUSCULAR; INTRAVENOUS PRN
Status: DISCONTINUED | OUTPATIENT
Start: 2024-06-21 | End: 2024-06-21

## 2024-06-21 RX ORDER — DEXAMETHASONE SODIUM PHOSPHATE 4 MG/ML
4 INJECTION, SOLUTION INTRA-ARTICULAR; INTRALESIONAL; INTRAMUSCULAR; INTRAVENOUS; SOFT TISSUE
Status: DISCONTINUED | OUTPATIENT
Start: 2024-06-21 | End: 2024-06-22 | Stop reason: HOSPADM

## 2024-06-21 RX ORDER — ONDANSETRON 4 MG/1
4 TABLET, ORALLY DISINTEGRATING ORAL EVERY 30 MIN PRN
Status: DISCONTINUED | OUTPATIENT
Start: 2024-06-21 | End: 2024-06-22 | Stop reason: HOSPADM

## 2024-06-21 RX ORDER — HYDROMORPHONE HCL IN WATER/PF 6 MG/30 ML
0.2 PATIENT CONTROLLED ANALGESIA SYRINGE INTRAVENOUS EVERY 5 MIN PRN
Status: DISCONTINUED | OUTPATIENT
Start: 2024-06-21 | End: 2024-06-22 | Stop reason: HOSPADM

## 2024-06-21 RX ORDER — OXYCODONE HYDROCHLORIDE 10 MG/1
10 TABLET ORAL
Status: DISCONTINUED | OUTPATIENT
Start: 2024-06-21 | End: 2024-06-22 | Stop reason: HOSPADM

## 2024-06-21 RX ORDER — NALOXONE HYDROCHLORIDE 0.4 MG/ML
0.1 INJECTION, SOLUTION INTRAMUSCULAR; INTRAVENOUS; SUBCUTANEOUS
Status: DISCONTINUED | OUTPATIENT
Start: 2024-06-21 | End: 2024-06-22 | Stop reason: HOSPADM

## 2024-06-21 RX ORDER — FENTANYL CITRATE 0.05 MG/ML
25 INJECTION, SOLUTION INTRAMUSCULAR; INTRAVENOUS
Status: DISCONTINUED | OUTPATIENT
Start: 2024-06-21 | End: 2024-06-22 | Stop reason: HOSPADM

## 2024-06-21 RX ORDER — ACETAMINOPHEN 325 MG/1
975 TABLET ORAL ONCE
Status: COMPLETED | OUTPATIENT
Start: 2024-06-21 | End: 2024-06-21

## 2024-06-21 RX ORDER — SODIUM CHLORIDE, SODIUM LACTATE, POTASSIUM CHLORIDE, CALCIUM CHLORIDE 600; 310; 30; 20 MG/100ML; MG/100ML; MG/100ML; MG/100ML
INJECTION, SOLUTION INTRAVENOUS CONTINUOUS
Status: DISCONTINUED | OUTPATIENT
Start: 2024-06-21 | End: 2024-06-22 | Stop reason: HOSPADM

## 2024-06-21 RX ORDER — HYDROMORPHONE HCL IN WATER/PF 6 MG/30 ML
0.4 PATIENT CONTROLLED ANALGESIA SYRINGE INTRAVENOUS EVERY 5 MIN PRN
Status: DISCONTINUED | OUTPATIENT
Start: 2024-06-21 | End: 2024-06-22 | Stop reason: HOSPADM

## 2024-06-21 RX ORDER — FENTANYL CITRATE 0.05 MG/ML
50 INJECTION, SOLUTION INTRAMUSCULAR; INTRAVENOUS EVERY 5 MIN PRN
Status: DISCONTINUED | OUTPATIENT
Start: 2024-06-21 | End: 2024-06-22 | Stop reason: HOSPADM

## 2024-06-21 RX ORDER — PHENAZOPYRIDINE HYDROCHLORIDE 200 MG/1
200 TABLET, FILM COATED ORAL ONCE
Status: COMPLETED | OUTPATIENT
Start: 2024-06-21 | End: 2024-06-21

## 2024-06-21 RX ORDER — LIDOCAINE HYDROCHLORIDE 20 MG/ML
INJECTION, SOLUTION INFILTRATION; PERINEURAL PRN
Status: DISCONTINUED | OUTPATIENT
Start: 2024-06-21 | End: 2024-06-21

## 2024-06-21 RX ORDER — PROPOFOL 10 MG/ML
INJECTION, EMULSION INTRAVENOUS CONTINUOUS PRN
Status: DISCONTINUED | OUTPATIENT
Start: 2024-06-21 | End: 2024-06-21

## 2024-06-21 RX ORDER — OXYCODONE HYDROCHLORIDE 5 MG/1
5 TABLET ORAL
Status: DISCONTINUED | OUTPATIENT
Start: 2024-06-21 | End: 2024-06-22 | Stop reason: HOSPADM

## 2024-06-21 RX ORDER — OXYBUTYNIN CHLORIDE 5 MG/1
5 TABLET ORAL ONCE
Status: COMPLETED | OUTPATIENT
Start: 2024-06-21 | End: 2024-06-21

## 2024-06-21 RX ORDER — ONDANSETRON 2 MG/ML
INJECTION INTRAMUSCULAR; INTRAVENOUS PRN
Status: DISCONTINUED | OUTPATIENT
Start: 2024-06-21 | End: 2024-06-21

## 2024-06-21 RX ORDER — TAMSULOSIN HYDROCHLORIDE 0.4 MG/1
0.4 CAPSULE ORAL DAILY
Qty: 30 CAPSULE | Refills: 0 | Status: SHIPPED | OUTPATIENT
Start: 2024-06-21

## 2024-06-21 RX ORDER — OXYBUTYNIN CHLORIDE 5 MG/1
5 TABLET ORAL 3 TIMES DAILY PRN
Qty: 12 TABLET | Refills: 0 | Status: SHIPPED | OUTPATIENT
Start: 2024-06-21

## 2024-06-21 RX ORDER — PROPOFOL 10 MG/ML
INJECTION, EMULSION INTRAVENOUS PRN
Status: DISCONTINUED | OUTPATIENT
Start: 2024-06-21 | End: 2024-06-21

## 2024-06-21 RX ORDER — DEXAMETHASONE SODIUM PHOSPHATE 4 MG/ML
INJECTION, SOLUTION INTRA-ARTICULAR; INTRALESIONAL; INTRAMUSCULAR; INTRAVENOUS; SOFT TISSUE PRN
Status: DISCONTINUED | OUTPATIENT
Start: 2024-06-21 | End: 2024-06-21

## 2024-06-21 RX ORDER — NITROFURANTOIN 25; 75 MG/1; MG/1
100 CAPSULE ORAL 2 TIMES DAILY
Qty: 10 CAPSULE | Refills: 0 | Status: SHIPPED | OUTPATIENT
Start: 2024-06-21

## 2024-06-21 RX ORDER — CEFAZOLIN SODIUM 2 G/100ML
2 INJECTION, SOLUTION INTRAVENOUS
Status: COMPLETED | OUTPATIENT
Start: 2024-06-21 | End: 2024-06-21

## 2024-06-21 RX ADMIN — CEFAZOLIN SODIUM 2 G: 2 INJECTION, SOLUTION INTRAVENOUS at 11:36

## 2024-06-21 RX ADMIN — LIDOCAINE HYDROCHLORIDE 50 MG: 20 INJECTION, SOLUTION INFILTRATION; PERINEURAL at 11:41

## 2024-06-21 RX ADMIN — PROPOFOL 200 MG: 10 INJECTION, EMULSION INTRAVENOUS at 11:41

## 2024-06-21 RX ADMIN — DEXAMETHASONE SODIUM PHOSPHATE 8 MG: 4 INJECTION, SOLUTION INTRA-ARTICULAR; INTRALESIONAL; INTRAMUSCULAR; INTRAVENOUS; SOFT TISSUE at 11:48

## 2024-06-21 RX ADMIN — PROPOFOL 180 MCG/KG/MIN: 10 INJECTION, EMULSION INTRAVENOUS at 11:41

## 2024-06-21 RX ADMIN — ONDANSETRON 4 MG: 2 INJECTION INTRAMUSCULAR; INTRAVENOUS at 12:20

## 2024-06-21 RX ADMIN — Medication 100 MCG: at 11:54

## 2024-06-21 RX ADMIN — Medication 100 MCG: at 12:07

## 2024-06-21 RX ADMIN — ACETAMINOPHEN 975 MG: 325 TABLET ORAL at 11:35

## 2024-06-21 RX ADMIN — PHENAZOPYRIDINE HYDROCHLORIDE 200 MG: 200 TABLET, FILM COATED ORAL at 13:25

## 2024-06-21 RX ADMIN — OXYBUTYNIN CHLORIDE 5 MG: 5 TABLET ORAL at 13:26

## 2024-06-21 RX ADMIN — FENTANYL CITRATE 100 MCG: 50 INJECTION, SOLUTION INTRAMUSCULAR; INTRAVENOUS at 11:41

## 2024-06-21 RX ADMIN — Medication 50 MCG: at 12:01

## 2024-06-21 RX ADMIN — SODIUM CHLORIDE, SODIUM LACTATE, POTASSIUM CHLORIDE, CALCIUM CHLORIDE: 600; 310; 30; 20 INJECTION, SOLUTION INTRAVENOUS at 10:52

## 2024-06-21 RX ADMIN — KETOROLAC TROMETHAMINE 15 MG: 30 INJECTION, SOLUTION INTRAMUSCULAR; INTRAVENOUS at 12:25

## 2024-06-21 RX ADMIN — SODIUM CHLORIDE, SODIUM LACTATE, POTASSIUM CHLORIDE, CALCIUM CHLORIDE: 600; 310; 30; 20 INJECTION, SOLUTION INTRAVENOUS at 12:46

## 2024-06-21 ASSESSMENT — LIFESTYLE VARIABLES: TOBACCO_USE: 1

## 2024-06-21 NOTE — PROGRESS NOTES
I personally met with Korey Arriola and discussed their current medical situation.     We reviewed the nature of planned surgery, the risks benefits and complications and treatment alternatives.      Shared decision made to proceed with cystoscopy and laser bladder neck incision, reviewed risk of possible symptoms, bleeding, scarring, retrograde ejaculation, need for catheter, need for further treatment, alternative treatments

## 2024-06-21 NOTE — OP NOTE
Operative Report  6/21/2024    PREOPERATIVE DIAGNOSIS:  Prostatic hypertrophy with lower urinary tract symptoms  POSTOPERATIVE DIAGNOSIS: Same as above    PROCEDURE PERFORMED: Thulium laser incision of the bladder neck and resection of the prostate  ATTENDING SURGEON: Doe Cintron MD  RESIDENT SURGEON: None    FINDINGS: Approximately 40 gm prostate with bilateral lobe hypertrophy. Bladder with moderate trabeculation, very high bladder neck  ANESTHESIA: general  INTRAVENOUS FLUIDS: See anesthesia records  ESTIMATED BLOOD LOSS: Minimal   SPECIMENS: Prostate adenoma  DRAINS: 20F 2 way genao     INDICATIONS FOR PROCEDURE: Korey Arriola is a(n) 48 year old male who was seen in consultation for luts. He has elected treatment with laser incision. Prostate volume estimated to be about 40 grams. His digital rectal exam was unremarkable.  After discussion of the risks, benefits and alternatives of the procedure, the patient agreed to proceed with the above stated procdure.    DESCRIPTION OF PROCEDURE: After obtaining informed consent, the patient was taken to the operating room and placed under general anesthesia.  He was repositioned in dorsal lithotomy making sure that the legs were positioned and padded safely.  He was then prepped and draped in standard sterile fashion.  Culture directed antibiotics were administered and bilateral sequential compression devices were placed.  A time out was performed confirming the appropriate patient identity and planned procedure.     The procedure was begun by generously lubricating the urethra.  The urethra was noted to be patent.  We passed a 550 micron thulium fiber laser through the scope and then passed the 22F cystoscope into the bladder.  The bladder had moderate trabeculations and was fairly large capacity.  The prostate had a very high bladder neck, the lateral lobes did not touch in midline.     We began by making paired incisions at the bladder neck at 5 and 7 oclock  which we carried down to the proximal veromontanum.  Our laser settings were 1 J and 30-60 Hz.  We connected the grooves and in the process isolated a median bar/lobe.  We pushed this up into the bladder and then remodeled the bladder neck by amputating the remaining posterior attachments and following the curvilinear shape of the bladder neck.  We next removed the specimen by grasper.    We looked back in and obtained final hemostasis.  We ensured the bladder was uninjured and there was no remaining tissue.      We inspected the fossa and obtained final hemostasis.   We looked the scope out noting that the sphincter was completely intact without evidence of thermal or mechanical injury.     We lubricated the urethra again and passed a 20F 2-way genao catheter over a catheter guide.  The urine was noted to be clear.  We filled the balloon with 15 ml of sterile water and did not place the catheter on traction.  The patient was woken from anesthesia and taken to the recovery room in stable condition.      POSTOPERATIVE PLAN:   -TOV Monday    Doe Cintron

## 2024-06-21 NOTE — DISCHARGE INSTRUCTIONS
If you have any questions or concerns regarding your procedure, please contact Dr. Cintron, his office number is [phone].     You have received 975 mg of Acetaminophen (Tylenol) at 11:35am. Please do not take an additional dose of Tylenol until after 5:35pm.     Do not exceed 4,000 mg of acetaminophen during a 24 hour period and keep in mind that acetaminophen can also be found in many over-the-counter cold medications as well as narcotics that may be given for pain.      You received a medication called Toradol (a stronger IV ibuprofen) at 12:25pm. Do NOT take any Ibuprofen / Advil / Aleve / Naproxen or products containing Ibuprofen until 6:25pm or later.      [facility]   Adult Discharge Orders & Instructions     For 24 hours after surgery    Get plenty of rest.  A responsible adult must stay with you for at least 24 hours after you leave the hospital.   Do not drive or use heavy equipment.  If you have weakness or tingling, don't drive or use heavy equipment until this feeling goes away.  Do not drink alcohol.  Avoid strenuous or risky activities.  Ask for help when climbing stairs.   You may feel lightheaded.  IF so, sit for a few minutes before standing.  Have someone help you get up.   If you have nausea (feel sick to your stomach): Drink only clear liquids such as apple juice, ginger ale, broth or 7-Up.  Rest may also help.  Be sure to drink enough fluids.  Move to a regular diet as you feel able.  You may have a slight fever. Call the doctor if your fever is over 100 F (37.7 C) (taken under the tongue) or lasts longer than 24 hours.  You may have a dry mouth, a sore throat, muscle aches or trouble sleeping.  These should go away after 24 hours.  Do not make important or legal decisions.   Call your doctor for any of the followin.  Signs of infection (fever, growing tenderness at the surgery site, a large amount of drainage or bleeding, severe pain, foul-smelling drainage,  Problem: Restraint Use - Nonviolent/Non-Self-Destructive Behavior:  Goal: Absence of restraint indications  Absence of restraint indications   Outcome: Not Met This Shift    Goal: Absence of restraint-related injury  Absence of restraint-related injury   Outcome: Met This Shift redness, swelling).    2. It has been over 8 to 10 hours since surgery and you are still not able to urinate (pass water).    3.  Headache for over 24 hours.

## 2024-06-21 NOTE — ANESTHESIA PROCEDURE NOTES
Airway       Patient location during procedure: OR  Staff -        CRNA: Argelia Pendleton APRN CRNA       Performed By: CRNA  Consent for Airway        Urgency: elective  Indications and Patient Condition       Indications for airway management: gregory-procedural       Induction type:intravenous       Mask difficulty assessment: 0 - not attempted    Final Airway Details       Final airway type: supraglottic airway    Supraglottic Airway Details        Type: LMA       LMA size: 5    Post intubation assessment        Placement verified by: capnometry, equal breath sounds and chest rise        Number of attempts at approach: 1       Number of other approaches attempted: 0       Secured with: tape       Ease of procedure: easy       Dentition: Intact

## 2024-06-21 NOTE — ANESTHESIA CARE TRANSFER NOTE
Patient: Korey Arriola    Procedure: Procedure(s):  CYSTOSCOPY, WITH MULTIPLE INCISIONS OF BLADDER NECK Using   thulium laser       Diagnosis: Urinary obstruction [N13.9]  Diagnosis Additional Information: No value filed.    Anesthesia Type:   General     Note:    Oropharynx: oropharynx clear of all foreign objects and spontaneously breathing  Level of Consciousness: drowsy  Oxygen Supplementation: face mask  Level of Supplemental Oxygen (L/min / FiO2): 5  Independent Airway: airway patency satisfactory and stable  Dentition: dentition unchanged  Vital Signs Stable: post-procedure vital signs reviewed and stable  Report to RN Given: handoff report given  Patient transferred to: PACU    Handoff Report: Identifed the Patient, Identified the Reponsible Provider, Reviewed the pertinent medical history, Discussed the surgical course, Reviewed Intra-OP anesthesia mangement and issues during anesthesia, Set expectations for post-procedure period and Allowed opportunity for questions and acknowledgement of understanding      Vitals:  Vitals Value Taken Time   BP 99/64 06/21/24 1230   Temp 36.1  C (96.9  F) 06/21/24 1229   Pulse 70 06/21/24 1231   Resp 14 06/21/24 1229   SpO2 99 % 06/21/24 1231   Vitals shown include unfiled device data.    Electronically Signed By: NANCY Reynoso CRNA  June 21, 2024  12:34 PM

## 2024-06-21 NOTE — ANESTHESIA PREPROCEDURE EVALUATION
Anesthesia Pre-Procedure Evaluation    Patient: Korey Arriola   MRN: [id number] : 1975        Procedure : Procedure(s):  CYSTOSCOPY, WITH MULTIPLE INCISIONS OF BLADDER NECK Using   thulium laser          Past Medical History:   Diagnosis Date    ADHD (attention deficit hyperactivity disorder)     Esophageal reflux     Pelvic floor dysfunction     Right shoulder pain     previous right shoulder surgery    Slow urinary stream       Past Surgical History:   Procedure Laterality Date    APPENDECTOMY OPEN      No Comments Provided    ARTHROSCOPY SHOULDER      [place] ANES SURGERY OF SHOULDER    ENT SURGERY        Allergies   Allergen Reactions    No Clinical Screening - See Comments      PN: Other1: -cats, PN: Pollen, dust, mold and animal dander  PN: Other1: -cats, PN: Pollen, dust, mold and animal dander        Social History     Tobacco Use    Smoking status: Never    Smokeless tobacco: Former     Quit date: 3/7/2013   Substance Use Topics    Alcohol use: Not Currently     Comment: Alcoholic Drinks/day: 15-18 per week      Wt Readings from Last 1 Encounters:   10/03/23 88.5 kg (195 lb)        Anesthesia Evaluation            ROS/MED HX  ENT/Pulmonary:  - neg pulmonary ROS   (+)                tobacco use, Past use,                       Neurologic:  - neg neurologic ROS     Cardiovascular:  - neg cardiovascular ROS     METS/Exercise Tolerance: >4 METS    Hematologic:  - neg hematologic  ROS     Musculoskeletal:  - neg musculoskeletal ROS     GI/Hepatic:  - neg GI/hepatic ROS     Renal/Genitourinary:  - neg Renal ROS   (+)        BPH,      Endo:  - neg endo ROS     Psychiatric/Substance Use:  - neg psychiatric ROS   (+) psychiatric history (adhd)        Infectious Disease:  - neg infectious disease ROS     Malignancy:  - neg malignancy ROS     Other:  - neg other ROS          Physical Exam    Airway        Mallampati: II   TM distance: > 3 FB   Neck ROM: full   Mouth opening: > 3 cm    Respiratory Devices and  "Support         Dental       (+) Minor Abnormalities - some fillings, tiny chips      Cardiovascular   cardiovascular exam normal          Pulmonary   pulmonary exam normal                OUTSIDE LABS:  CBC: No results found for: \"WBC\", \"HGB\", \"HCT\", \"PLT\"  BMP: No results found for: \"NA\", \"POTASSIUM\", \"CHLORIDE\", \"CO2\", \"BUN\", \"CR\", \"GLC\"  COAGS: No results found for: \"PTT\", \"INR\", \"FIBR\"  POC: No results found for: \"BGM\", \"HCG\", \"HCGS\"  HEPATIC: No results found for: \"ALBUMIN\", \"PROTTOTAL\", \"ALT\", \"AST\", \"GGT\", \"ALKPHOS\", \"BILITOTAL\", \"BILIDIRECT\", \"CATRACHO\"  OTHER: No results found for: \"PH\", \"LACT\", \"A1C\", \"MARIANA\", \"PHOS\", \"MAG\", \"LIPASE\", \"AMYLASE\", \"TSH\", \"T4\", \"T3\", \"CRP\", \"SED\"    Anesthesia Plan    ASA Status:  2    NPO Status:  NPO Appropriate    Anesthesia Type: General.     - Airway: LMA   Induction: Propofol.   Maintenance: TIVA.        Consents    Anesthesia Plan(s) and associated risks, benefits, and realistic alternatives discussed. Questions answered and patient/representative(s) expressed understanding.     - Discussed:     - Discussed with:  Patient, Spouse            Postoperative Care    Pain management: Multi-modal analgesia.   PONV prophylaxis: Ondansetron (or other 5HT-3), Dexamethasone or Solumedrol     Comments:    Other Comments: Reviewed anesthetic options and risks, including risk of dental trauma. Patient agrees to proceed.            Nitesh Mosher MD    I have reviewed the pertinent notes and labs in the chart from the past 30 days and (re)examined the patient.  Any updates or changes from those notes are reflected in this note.                  "

## 2024-06-21 NOTE — ANESTHESIA POSTPROCEDURE EVALUATION
Patient: Korey Arriola    Procedure: Procedure(s):  CYSTOSCOPY, WITH MULTIPLE INCISIONS OF BLADDER NECK Using   thulium laser       Anesthesia Type:  General    Note:  Disposition: Outpatient   Postop Pain Control: Uneventful            Sign Out: Well controlled pain   PONV: No   Neuro/Psych: Uneventful            Sign Out: Acceptable/Baseline neuro status   Airway/Respiratory: Uneventful            Sign Out: Acceptable/Baseline resp. status   CV/Hemodynamics: Uneventful            Sign Out: Acceptable CV status; No obvious hypovolemia; No obvious fluid overload   Other NRE: NONE   DID A NON-ROUTINE EVENT OCCUR? No           Last vitals:  Vitals Value Taken Time   BP 93/57 06/21/24 1253   Temp 96.9  F (36.1  C) 06/21/24 1253   Pulse 59 06/21/24 1253   Resp 16 06/21/24 1253   SpO2 98 % 06/21/24 1253       Electronically Signed By: Nitesh Mosher MD  June 21, 2024  1:20 PM

## 2024-06-21 NOTE — PROGRESS NOTES
Pt frustrated with discomfort from genao catheter. Pt and s/o spoke to Dr Cintron. Understand he needs to keep catheter in place till Monday's appt and TOV. Oxybutynin and Pyridium given with some relief. Stated ready for discharge. Education done with pt and s/o about catheter care and changing leg bag. Voiced understanding. Discharged to home without issue.

## 2024-06-24 ENCOUNTER — ALLIED HEALTH/NURSE VISIT (OUTPATIENT)
Dept: UROLOGY | Facility: CLINIC | Age: 49
End: 2024-06-24
Payer: COMMERCIAL

## 2024-06-24 DIAGNOSIS — N13.9 URINARY OBSTRUCTION: Primary | ICD-10-CM

## 2024-06-24 PROCEDURE — 99024 POSTOP FOLLOW-UP VISIT: CPT

## 2024-06-24 NOTE — PROGRESS NOTES
Korey Arriola comes into clinic today at the request of Dr. Cintronwith the diagnosis of urinary retention for a TOV.    The following medication was given: n/a- currently on macrobid    Prior to medication administration, verified patient identity using patient's name and date of birth.  Due to medication administration, patient instructed to remain in clinic for 15 minutes  afterwards, and to report any adverse reaction to me immediately.    Approx 300 mL of sterile water instilled into the bladder via catheter.      Removal:  20 Fr straight tipped latex genao catheter removed from urethral meatus without difficulty after removing 15 mL of fluid from the balloon, balloon intact.    Patient voided approx 200 mL of  urine.     Post-void residual was: 140 mL per bladder scan.    Patient tolerated procedure well.      Education: Teaching done with patient verbally as to where to go or call  if unable to urinate post-catheter removal. Increase fluids.     RN discussed CIC teaching with patient as a preventative measure to avoid having to return to the hospital should his PVR continue to increase. Pt declined, stating he feels he would be unable to do this himself. He will call RN or return to hospital in the event he goes into retention again.     Plan: Follow-up message sent to Dr. Cintron       This service provided today was under the supervising provider of the day Nivia Dick CNP  who was available if needed.    Chelly Lopes RN

## 2024-07-01 RX ORDER — TAMSULOSIN HYDROCHLORIDE 0.4 MG/1
0.4 CAPSULE ORAL DAILY
Qty: 90 CAPSULE | OUTPATIENT
Start: 2024-07-01

## 2024-07-17 ENCOUNTER — PRE VISIT (OUTPATIENT)
Dept: UROLOGY | Facility: CLINIC | Age: 49
End: 2024-07-17
Payer: COMMERCIAL

## 2024-07-17 NOTE — TELEPHONE ENCOUNTER
Reason for visit:   Surgical follow-up      Relevant information:   6/21/24 Cysto w/ multiple bladder incisions (BPH)    Records/imaging/labs/orders:   Lab results available in Epic    Pt called: No need for a call    At Rooming:   Ask Dr. Cintron if he would like a flow/PVR    Cassie Stevens LPN  7/17/2024  4:56 PM

## 2024-07-23 ENCOUNTER — OFFICE VISIT (OUTPATIENT)
Dept: UROLOGY | Facility: CLINIC | Age: 49
End: 2024-07-23
Payer: COMMERCIAL

## 2024-07-23 VITALS
OXYGEN SATURATION: 100 % | HEART RATE: 71 BPM | BODY MASS INDEX: 25.04 KG/M2 | SYSTOLIC BLOOD PRESSURE: 113 MMHG | DIASTOLIC BLOOD PRESSURE: 79 MMHG | HEIGHT: 74 IN

## 2024-07-23 DIAGNOSIS — N13.9 URINARY OBSTRUCTION: Primary | ICD-10-CM

## 2024-07-23 DIAGNOSIS — M62.89 PELVIC FLOOR DYSFUNCTION: ICD-10-CM

## 2024-07-23 DIAGNOSIS — N40.0 ENLARGED PROSTATE: ICD-10-CM

## 2024-07-23 PROCEDURE — 99024 POSTOP FOLLOW-UP VISIT: CPT | Performed by: UROLOGY

## 2024-07-23 ASSESSMENT — PAIN SCALES - GENERAL: PAINLEVEL: NO PAIN (0)

## 2024-07-23 NOTE — LETTER
7/23/2024       RE: Korey Arriola  [address] [facility] [address]     Dear Colleague,    Thank you for referring your patient, Korey Arriola, to the [facility] [place] at [facility]. Please see a copy of my visit note below.    HOLEP FOLLOW-UP NOTE    Today I had the pleasure of seeing Mr. Arriola in follow-up for a history of LUTS.    He underwent thulium laser resection of bladder neck for primary bladder neck obstruction. .  Pathology was benign.           - COMPATIBLE WITH BENIGN NODULAR PROSTATIC HYPERPLASIA AND CYSTITIS   CYSTICA     Today he notes substantial symptom improvement, he is delighted with outcome of procedure.  PVR much improved, 100 from 300s preop.  qMax is excellent, 18 mL/sec.  No leakage    Assessment/Plan - 48 year old male 4weeks status post bladder neck incision  -F/U annually with us or with PMD for PSA  -Encouraged to call with any questions, concerns or symptom recurrence    I, Doe Cintron saw and evaluated this patient and agree with the plan as stated above     Doe Cintron MD

## 2024-07-23 NOTE — PROGRESS NOTES
HOLEP FOLLOW-UP NOTE    Today I had the pleasure of seeing Mr. Arriola in follow-up for a history of LUTS.    He underwent thulium laser resection of bladder neck for primary bladder neck obstruction. .  Pathology was benign.           - COMPATIBLE WITH BENIGN NODULAR PROSTATIC HYPERPLASIA AND CYSTITIS   CYSTICA     Today he notes substantial symptom improvement, he is delighted with outcome of procedure.  PVR much improved, 100 from 300s preop.  qMax is excellent, 18 mL/sec.  No leakage    Assessment/Plan - 48 year old male 4weeks status post bladder neck incision  -F/U annually with us or with PMD for PSA  -Encouraged to call with any questions, concerns or symptom recurrence    I, Doe Cintron saw and evaluated this patient and agree with the plan as stated above

## 2024-07-29 RX ORDER — TAMSULOSIN HYDROCHLORIDE 0.4 MG/1
0.4 CAPSULE ORAL DAILY
Qty: 30 CAPSULE | Refills: 0 | OUTPATIENT
Start: 2024-07-29

## (undated) RX ORDER — LIDOCAINE HYDROCHLORIDE 20 MG/ML
JELLY TOPICAL
Status: DISPENSED
Start: 2023-03-07

## (undated) RX ORDER — CEPHALEXIN 500 MG/1
CAPSULE ORAL
Status: DISPENSED
Start: 2023-03-07